# Patient Record
Sex: FEMALE | Race: WHITE | NOT HISPANIC OR LATINO | URBAN - METROPOLITAN AREA
[De-identification: names, ages, dates, MRNs, and addresses within clinical notes are randomized per-mention and may not be internally consistent; named-entity substitution may affect disease eponyms.]

---

## 2020-06-09 ENCOUNTER — WEB ENCOUNTER (OUTPATIENT)
Dept: URBAN - METROPOLITAN AREA CLINIC 96 | Facility: CLINIC | Age: 40
End: 2020-06-09

## 2020-07-31 ENCOUNTER — OFFICE VISIT (OUTPATIENT)
Dept: URBAN - METROPOLITAN AREA SURGERY CENTER 18 | Facility: SURGERY CENTER | Age: 40
End: 2020-07-31
Payer: COMMERCIAL

## 2020-07-31 DIAGNOSIS — K29.30 CHRONIC SUPERFICIAL GASTRITIS: ICD-10-CM

## 2020-07-31 DIAGNOSIS — K22.8 COLUMNAR-LINED ESOPHAGUS: ICD-10-CM

## 2020-07-31 PROCEDURE — 43239 EGD BIOPSY SINGLE/MULTIPLE: CPT | Performed by: INTERNAL MEDICINE

## 2020-07-31 PROCEDURE — G8907 PT DOC NO EVENTS ON DISCHARG: HCPCS | Performed by: INTERNAL MEDICINE

## 2021-01-20 ENCOUNTER — OFFICE VISIT (OUTPATIENT)
Dept: URBAN - METROPOLITAN AREA CLINIC 96 | Facility: CLINIC | Age: 41
End: 2021-01-20

## 2021-09-15 ENCOUNTER — LAB OUTSIDE AN ENCOUNTER (OUTPATIENT)
Dept: URBAN - METROPOLITAN AREA CLINIC 96 | Facility: CLINIC | Age: 41
End: 2021-09-15

## 2021-09-15 ENCOUNTER — OFFICE VISIT (OUTPATIENT)
Dept: URBAN - METROPOLITAN AREA CLINIC 96 | Facility: CLINIC | Age: 41
End: 2021-09-15
Payer: COMMERCIAL

## 2021-09-15 ENCOUNTER — ERX REFILL RESPONSE (OUTPATIENT)
Dept: URBAN - METROPOLITAN AREA CLINIC 96 | Facility: CLINIC | Age: 41
End: 2021-09-15

## 2021-09-15 ENCOUNTER — WEB ENCOUNTER (OUTPATIENT)
Dept: URBAN - METROPOLITAN AREA CLINIC 96 | Facility: CLINIC | Age: 41
End: 2021-09-15

## 2021-09-15 DIAGNOSIS — K64.9 HEMORRHOIDS, UNSPECIFIED HEMORRHOID TYPE: ICD-10-CM

## 2021-09-15 DIAGNOSIS — R09.89 GLOBUS PHARYNGEUS: ICD-10-CM

## 2021-09-15 DIAGNOSIS — K21.9 GASTROESOPHAGEAL REFLUX DISEASE WITHOUT ESOPHAGITIS: ICD-10-CM

## 2021-09-15 DIAGNOSIS — Z86.010 PERSONAL HISTORY OF COLONIC POLYPS: ICD-10-CM

## 2021-09-15 PROCEDURE — 99204 OFFICE O/P NEW MOD 45 MIN: CPT | Performed by: INTERNAL MEDICINE

## 2021-09-15 RX ORDER — ONDANSETRON HYDROCHLORIDE 4 MG/1
1 TABLET AS NEEDED TABLET, FILM COATED ORAL ONCE A DAY
Qty: 2 | Refills: 0 | OUTPATIENT
Start: 2021-09-15

## 2021-09-15 RX ORDER — ONDANSETRON HYDROCHLORIDE 4 MG/1
2 TABLETS TABLET, FILM COATED ORAL
Qty: 2 | Refills: 0 | OUTPATIENT

## 2021-09-15 RX ORDER — SODIUM, POTASSIUM,MAG SULFATES 17.5-3.13G
SOLUTION, RECONSTITUTED, ORAL ORAL AS DIRECTED
Qty: 177 MILLILITER | Refills: 0 | OUTPATIENT
Start: 2021-09-15 | End: 2021-09-16

## 2021-09-15 RX ORDER — ONDANSETRON HYDROCHLORIDE 4 MG/1
1 TABLET AS NEEDED TABLET, FILM COATED ORAL ONCE A DAY
Qty: 2 | Refills: 0 | OUTPATIENT

## 2021-09-15 RX ORDER — LEVOTHYROXINE SODIUM 112 UG/1
TABLET ORAL
Qty: 0 | Refills: 0 | Status: ACTIVE | COMMUNITY
Start: 1900-01-01

## 2021-09-15 NOTE — HPI-TODAY'S VISIT:
This is a 41-year-old female referred by Corina Woods for GI office follow-up of GERD and a copy this note be sent to the referring provider.  Patient was seen by me for second opinion back in May 2020 for telehealth appointment.  She had seen Dr. Calvillo for throat burning in 2020 and was prescribed a PPI.  I had seen her back in 2014 at which time she was postpartum and having some itching and bleeding from presumed hemorrhoids.  She saw Dr. Dustin Christensen of [facility] colorectal surgery and she states that eventually she did have a colonoscopy with them with a female provider in their practice.  She said they removed 2 benign polyps in the rectum and told her she was not due for another 8 years.  Patient had bad heartburn as well as throat burning.  She had taken omeprazole for a month then cut down to 20 mg but had symptoms again.  She would have some throat burning especially at night.  An EGD was done on July 31, 2020 and this exam revealed no gross abnormalities.  Biopsies were sent and showed no evidence of celiac, there is mild chronic gastritis but no H. pylori and esophageal biopsy of the lower esophagus showed some mild chronic inflammation mid esophageal biopsies were negative for EOE.  Patient returns for follow-up now. Pts kids in school and doing well. Pt had swallowing sensation and lump in throat despite PPIs- always there but sometimes gets better with eating. Pt says it bothers her most at night when lying down. Pt has been on pantoprazole and saw an ENT- Dr Lr out of [place] for the last 6 months. Pt was dx with LPR and pt had enlarged tonsils that needed to shrink- she had 3 rounds of 2 weeks each- this did not help. Pt had an extreme restriction Pt had a laryngoscopy monthly. Pt says all of this helped about 20%. Pt says since then she gave up and is back wondering what else she can do. Pt says on once a day PPI but she has symptoms again when she goes down to once a day she has back pain and heartburn.

## 2022-02-08 PROBLEM — 266435005: Status: ACTIVE | Noted: 2021-09-14

## 2022-02-18 ENCOUNTER — OFFICE VISIT (OUTPATIENT)
Dept: URBAN - METROPOLITAN AREA SURGERY CENTER 18 | Facility: SURGERY CENTER | Age: 42
End: 2022-02-18
Payer: COMMERCIAL

## 2022-02-18 DIAGNOSIS — K63.5 BENIGN COLON POLYP: ICD-10-CM

## 2022-02-18 DIAGNOSIS — K29.30 CHRONIC SUPERFICIAL GASTRITIS: ICD-10-CM

## 2022-02-18 DIAGNOSIS — Z86.010 ADENOMAS PERSONAL HISTORY OF COLONIC POLYPS: ICD-10-CM

## 2022-02-18 DIAGNOSIS — K52.89 OTHER AND UNSPECIFIED NONINFECTIOUS GASTROENTERITIS AND COLITIS: ICD-10-CM

## 2022-02-18 DIAGNOSIS — K22.89 OTHER SPECIFIED DISEASE OF ESOPHAGUS: ICD-10-CM

## 2022-02-18 PROCEDURE — 43239 EGD BIOPSY SINGLE/MULTIPLE: CPT | Performed by: INTERNAL MEDICINE

## 2022-02-18 PROCEDURE — G8907 PT DOC NO EVENTS ON DISCHARG: HCPCS | Performed by: INTERNAL MEDICINE

## 2022-02-18 PROCEDURE — 45380 COLONOSCOPY AND BIOPSY: CPT | Performed by: INTERNAL MEDICINE

## 2022-07-12 ENCOUNTER — OFFICE VISIT (OUTPATIENT)
Dept: URBAN - METROPOLITAN AREA TELEHEALTH 2 | Facility: TELEHEALTH | Age: 42
End: 2022-07-12
Payer: COMMERCIAL

## 2022-07-12 VITALS — HEIGHT: 62 IN | BODY MASS INDEX: 23.92 KG/M2 | WEIGHT: 130 LBS

## 2022-07-12 DIAGNOSIS — Z86.010 PERSONAL HISTORY OF COLONIC POLYPS: ICD-10-CM

## 2022-07-12 DIAGNOSIS — K21.9 ACID REFLUX: ICD-10-CM

## 2022-07-12 PROBLEM — 698065002 ACID REFLUX: Status: ACTIVE | Noted: 2022-07-12

## 2022-07-12 PROBLEM — 428283002: Status: ACTIVE | Noted: 2021-09-15

## 2022-07-12 PROCEDURE — 99213 OFFICE O/P EST LOW 20 MIN: CPT | Performed by: INTERNAL MEDICINE

## 2022-07-12 RX ORDER — ONDANSETRON HYDROCHLORIDE 4 MG/1
2 TABLETS TABLET, FILM COATED ORAL
Qty: 2 | Refills: 0 | Status: DISCONTINUED | COMMUNITY

## 2022-07-12 RX ORDER — PANTOPRAZOLE SODIUM 40 MG/1
1 TABLET TABLET, DELAYED RELEASE ORAL ONCE A DAY
Status: ACTIVE | COMMUNITY

## 2022-07-12 RX ORDER — PANTOPRAZOLE SODIUM 40 MG/1
1 TABLET TABLET, DELAYED RELEASE ORAL ONCE A DAY
Qty: 90 TABLET | Refills: 3 | OUTPATIENT
Start: 2022-07-12

## 2022-07-12 RX ORDER — LEVOTHYROXINE SODIUM 112 UG/1
TABLET ORAL
Qty: 0 | Refills: 0 | Status: ACTIVE | COMMUNITY
Start: 1900-01-01

## 2022-07-12 NOTE — HPI-TODAY'S VISIT:
Pt had egd and colon 2/18/22 -- mild chronic gastritis, internal hemorrhoids, 4 benign polyps, ulcerated mucosa in the TI with path showing focal active colitis - no granulomas - overall benign She needs a refill of Pantoprazole - acid is under control No issues with acid reflux anymore The globus sensation is almost 90% gone worse when she has stress in her life But she is a lot better right now

## 2022-09-15 ENCOUNTER — P2P PATIENT RECORD (OUTPATIENT)
Age: 42
End: 2022-09-15

## 2023-04-28 ENCOUNTER — DASHBOARD ENCOUNTERS (OUTPATIENT)
Age: 43
End: 2023-04-28

## 2023-04-28 ENCOUNTER — CLAIMS CREATED FROM THE CLAIM WINDOW (OUTPATIENT)
Dept: URBAN - METROPOLITAN AREA TELEHEALTH 2 | Facility: TELEHEALTH | Age: 43
End: 2023-04-28
Payer: COMMERCIAL

## 2023-04-28 VITALS — WEIGHT: 130 LBS | BODY MASS INDEX: 23.92 KG/M2 | HEIGHT: 62 IN

## 2023-04-28 DIAGNOSIS — Z86.010 PERSONAL HISTORY OF COLONIC POLYPS: ICD-10-CM

## 2023-04-28 DIAGNOSIS — K21.9 ACID REFLUX: ICD-10-CM

## 2023-04-28 PROCEDURE — 99213 OFFICE O/P EST LOW 20 MIN: CPT | Performed by: INTERNAL MEDICINE

## 2023-04-28 PROCEDURE — 99213 OFFICE O/P EST LOW 20 MIN: CPT | Performed by: PHYSICIAN ASSISTANT

## 2023-04-28 RX ORDER — PANTOPRAZOLE SODIUM 40 MG/1
1 TABLET TABLET, DELAYED RELEASE ORAL ONCE A DAY
Qty: 90 TABLET | Refills: 3 | OUTPATIENT

## 2023-04-28 RX ORDER — PANTOPRAZOLE SODIUM 40 MG/1
1 TABLET TABLET, DELAYED RELEASE ORAL ONCE A DAY
Status: ACTIVE | COMMUNITY

## 2023-04-28 RX ORDER — PANTOPRAZOLE SODIUM 20 MG/1
1 TABLET TABLET, DELAYED RELEASE ORAL ONCE A DAY
Qty: 30 | Refills: 0 | OUTPATIENT
Start: 2023-04-28

## 2023-04-28 RX ORDER — PANTOPRAZOLE SODIUM 40 MG/1
1 TABLET TABLET, DELAYED RELEASE ORAL ONCE A DAY
Qty: 90 TABLET | Refills: 3 | Status: DISCONTINUED | COMMUNITY
Start: 2022-07-12

## 2023-04-28 RX ORDER — LEVOTHYROXINE SODIUM 112 UG/1
TABLET ORAL
Qty: 0 | Refills: 0 | Status: ACTIVE | COMMUNITY
Start: 1900-01-01

## 2023-04-28 NOTE — HPI-TODAY'S VISIT:
Pt is doing well on the Pantoprazole 40mg a day no heartburn or indigestion she feels great in November she tried taking 20mg a day and wean herself off and her heartburn came back no dysphagia but still gets globus sensation at times - so much better than in 2020 no nausea or emesis up to date on procedures

## 2023-06-01 ENCOUNTER — ERX REFILL RESPONSE (OUTPATIENT)
Dept: URBAN - METROPOLITAN AREA CLINIC 80 | Facility: CLINIC | Age: 43
End: 2023-06-01

## 2023-06-01 RX ORDER — PANTOPRAZOLE SODIUM 40 MG/1
1 TABLET TABLET, DELAYED RELEASE ORAL ONCE A DAY
Qty: 90 TABLET | Refills: 3 | OUTPATIENT

## 2024-07-16 ENCOUNTER — ERX REFILL RESPONSE (OUTPATIENT)
Dept: URBAN - METROPOLITAN AREA CLINIC 80 | Facility: CLINIC | Age: 44
End: 2024-07-16

## 2024-07-16 RX ORDER — PANTOPRAZOLE SODIUM 40 MG/1
TAKE 1 TABLET BY MOUTH EVERY DAY FOR 90 DAYS TABLET, DELAYED RELEASE ORAL
Qty: 90 TABLET | Refills: 0 | OUTPATIENT

## 2024-07-16 RX ORDER — PANTOPRAZOLE SODIUM 40 MG/1
TAKE 1 TABLET BY MOUTH EVERY DAY FOR 90 DAYS TABLET, DELAYED RELEASE ORAL
Qty: 90 TABLET | Refills: 4 | OUTPATIENT

## 2024-07-17 ENCOUNTER — OFFICE VISIT (OUTPATIENT)
Dept: URBAN - METROPOLITAN AREA CLINIC 96 | Facility: CLINIC | Age: 44
End: 2024-07-17
Payer: COMMERCIAL

## 2024-07-17 ENCOUNTER — LAB OUTSIDE AN ENCOUNTER (OUTPATIENT)
Dept: URBAN - METROPOLITAN AREA CLINIC 96 | Facility: CLINIC | Age: 44
End: 2024-07-17

## 2024-07-17 VITALS
TEMPERATURE: 97.7 F | SYSTOLIC BLOOD PRESSURE: 113 MMHG | BODY MASS INDEX: 23.74 KG/M2 | HEART RATE: 67 BPM | WEIGHT: 129 LBS | HEIGHT: 62 IN | DIASTOLIC BLOOD PRESSURE: 76 MMHG

## 2024-07-17 DIAGNOSIS — K59.04 CHRONIC IDIOPATHIC CONSTIPATION: ICD-10-CM

## 2024-07-17 DIAGNOSIS — K21.9 ACID REFLUX: ICD-10-CM

## 2024-07-17 DIAGNOSIS — Z86.010 PERSONAL HISTORY OF COLONIC POLYPS: ICD-10-CM

## 2024-07-17 PROBLEM — 82934008: Status: ACTIVE | Noted: 2024-07-17

## 2024-07-17 PROCEDURE — 99204 OFFICE O/P NEW MOD 45 MIN: CPT | Performed by: INTERNAL MEDICINE

## 2024-07-17 RX ORDER — PANTOPRAZOLE SODIUM 40 MG/1
1 TABLET TABLET, DELAYED RELEASE ORAL ONCE A DAY
Qty: 90 TABLET | Refills: 3 | OUTPATIENT
Start: 2024-07-17

## 2024-07-17 RX ORDER — PANTOPRAZOLE SODIUM 40 MG/1
TAKE 1 TABLET BY MOUTH EVERY DAY FOR 90 DAYS TABLET, DELAYED RELEASE ORAL
Qty: 90 EACH | Refills: 1 | Status: ACTIVE | COMMUNITY

## 2024-07-17 RX ORDER — LEVOTHYROXINE SODIUM 112 UG/1
TABLET ORAL
Qty: 0 | Refills: 0 | Status: ACTIVE | COMMUNITY
Start: 1900-01-01

## 2024-07-17 NOTE — HPI-TODAY'S VISIT:
43 yo fem ref Dr Woods for a gi fu of gerd and last seen in 4/2023 by our PA, Darryn. A copy of this note will be sent to the ref provider.  Pt had egd and colon 2/18/22 -- mild chronic gastritis, reflux changes on bx. Colon-internal hemorrhoids, 4 benign polyps, ulcerated mucosa in the TI with path showing focal active colitis - no granulomas - overall benign Pt was doing well in 2022 on pantoprazole but takes 40mg daily.  Depending on stress she sometimes can go down to 20mg a day. Pt is going once a day with BMs

## 2025-07-09 ENCOUNTER — OFFICE VISIT (OUTPATIENT)
Dept: URBAN - METROPOLITAN AREA TELEHEALTH 2 | Facility: TELEHEALTH | Age: 45
End: 2025-07-09
Payer: COMMERCIAL

## 2025-07-09 DIAGNOSIS — K59.04 CHRONIC IDIOPATHIC CONSTIPATION: ICD-10-CM

## 2025-07-09 DIAGNOSIS — K21.9 ACID REFLUX: ICD-10-CM

## 2025-07-09 DIAGNOSIS — Z86.0102 PERSONAL HISTORY OF HYPERPLASTIC COLON POLYPS: ICD-10-CM

## 2025-07-09 PROCEDURE — 99204 OFFICE O/P NEW MOD 45 MIN: CPT | Performed by: INTERNAL MEDICINE

## 2025-07-09 RX ORDER — LEVOTHYROXINE SODIUM 112 UG/1
TABLET ORAL
Qty: 0 | Refills: 0 | Status: ACTIVE | COMMUNITY
Start: 1900-01-01

## 2025-07-09 RX ORDER — PANTOPRAZOLE SODIUM 40 MG/1
TAKE 1 TABLET BY MOUTH EVERY DAY FOR 90 DAYS TABLET, DELAYED RELEASE ORAL
Qty: 90 EACH | Refills: 1 | Status: ACTIVE | COMMUNITY

## 2025-07-09 RX ORDER — PANTOPRAZOLE SODIUM 40 MG/1
1 TABLET TABLET, DELAYED RELEASE ORAL ONCE A DAY
Qty: 90 TABLET | Refills: 3 | OUTPATIENT
Start: 2025-07-08

## 2025-07-09 RX ORDER — PANTOPRAZOLE SODIUM 40 MG/1
1 TABLET TABLET, DELAYED RELEASE ORAL ONCE A DAY
Qty: 90 TABLET | Refills: 3 | Status: ON HOLD | COMMUNITY
Start: 2024-07-17

## 2025-07-09 NOTE — HPI-TODAY'S VISIT:
Patient had both EGD and colonoscopy on February 18, 2022.  EGD was done for GERD and globus sensation.  This showed no gross abnormalities.  Colonoscopy done for history of polyps showed internal hemorrhoids, 4 polyps removed and there were some ulcers in the TI.  Biopsies were sent.  Pathology showed no evidence of celiac, mild chronic gastritis but no H. pylori and some mild chronic inflammation of the esophagus.  TI biopsy showed focal active ileitis with erosion but no features of chronicity were seen.  Polyps were benign.  I had recommended a repeat colonoscopy in 5 years or February 2027 unless patient developed new symptoms.  Patient was seen in the office in July 2020 for and was playing with different doses of her pantoprazole.  Sometimes she would take 40 mg other time she would take 20 mg.  She had a history of constipation so treated this with dietary modifications.  Patient had no complaints of diarrhea or family history of IBD noted. Chante, a 45-year-old female, presented for her annual check-in and medication refill. She feels well, continues pantoprazole 40 mg daily, and is considering reducing the dose by cutting the tablet in half. She originally took the medication for globus sensation and occasional heartburn but denies classic reflux symptoms and reports a normal prior endoscopy. She had benign colon polyps removed 3-4 years ago and wanted to confirm her next colonoscopy timing. Chante denies any concerning gastrointestinal symptoms and manages a history of constipation with a high-fiber diet and hydration. No diarrhea either.